# Patient Record
Sex: MALE | Race: WHITE
[De-identification: names, ages, dates, MRNs, and addresses within clinical notes are randomized per-mention and may not be internally consistent; named-entity substitution may affect disease eponyms.]

---

## 2018-07-18 ENCOUNTER — HOSPITAL ENCOUNTER (OUTPATIENT)
Dept: HOSPITAL 88 - CT | Age: 28
End: 2018-07-18
Attending: FAMILY MEDICINE
Payer: COMMERCIAL

## 2018-07-18 DIAGNOSIS — M25.521: Primary | ICD-10-CM

## 2018-07-18 DIAGNOSIS — S59.901A: ICD-10-CM

## 2018-07-18 DIAGNOSIS — R93.7: ICD-10-CM

## 2018-07-18 NOTE — DIAGNOSTIC IMAGING REPORT
TECHNIQUE:

Computed tomography imaging of the RIGHT ELBOW was performed WITHOUT injected

contrast. 



HISTORY:  Pain, trauma evaluate for fracture



COMPARISON: None available.



FINDINGS:



No fracture. No osteonecrosis. No lytic or blastic lesion.



Elbow joint alignment normal. No effusion. No arthropathy.



Soft tissues unremarkable. No fluid collection or mass.





IMPRESSION: 



Normal CT of the elbow



Signed by: Dr. Andrew Palisch, M.D. on 7/18/2018 4:12 PM